# Patient Record
Sex: FEMALE | Race: WHITE
[De-identification: names, ages, dates, MRNs, and addresses within clinical notes are randomized per-mention and may not be internally consistent; named-entity substitution may affect disease eponyms.]

---

## 2017-02-22 ENCOUNTER — HOSPITAL ENCOUNTER (OUTPATIENT)
Dept: HOSPITAL 39 - GMAH | Age: 77
Discharge: HOME | End: 2017-02-22
Attending: FAMILY MEDICINE
Payer: MEDICARE

## 2017-02-22 DIAGNOSIS — I10: Primary | ICD-10-CM

## 2017-03-01 ENCOUNTER — HOSPITAL ENCOUNTER (OUTPATIENT)
Dept: HOSPITAL 39 - MRI | Age: 77
Discharge: HOME | End: 2017-03-01
Attending: FAMILY MEDICINE
Payer: MEDICARE

## 2017-03-01 DIAGNOSIS — M17.12: Primary | ICD-10-CM

## 2017-03-01 DIAGNOSIS — M25.462: ICD-10-CM

## 2017-03-02 NOTE — MRI
Study: MRI of the Left Knee. 



Indication: LEFT KNEE PAIN  



Technique: Multiplanar, multi sequence MRI of the left knee was

obtained without intravenous contrast. 



Comparison: None.



Findings:



ACL, PCL, and lateral compartment ligament intact. Remote MCL

sprain.



Macerated body medial meniscus extruded by 5 mm. Advanced-severe

medial compartment osteoarthritis with complete grade 4 chondral

loss and significant cortical remodeling



A component of subtle vertical radial tear of the midsubstance

body lateral meniscus is suspected. Grade 2/3 chondral thinning

throughout the majority lateral compartment with several areas of

grade 4 chondral loss centrally.



Tendinosis quadriceps tendon insertion and patellar tendon origin

without tear. Bone-on-bone appearance between the medial patellar

facet and medial femoral trochlea with grade 4 chondral loss and

cortical remodeling at the site. Less pronounced chondrosis more

laterally.



Moderate size knee effusion. Ganglion formation associated with

the origins of the medial and lateral heads of gastrocnemius

musculature. Small Baker cyst. No acute fracture or osseous

contusion.



Impression:



Maceration medial meniscus.



Suspected vertical radial tearing body lateral meniscus.



Tricompartmental osteoarthritis most pronounced medial

compartment where there is advanced severe changes.



Moderate-sized knee effusion.



Additional findings as above.



Electronically signed by:  Prateek Stevens MD  3/2/2017 8:16 AM CST

## 2017-06-19 ENCOUNTER — HOSPITAL ENCOUNTER (INPATIENT)
Dept: HOSPITAL 39 - MS | Age: 77
LOS: 6 days | Discharge: HOME HEALTH | DRG: 561 | End: 2017-06-25
Attending: NURSE PRACTITIONER | Admitting: NURSE PRACTITIONER
Payer: MEDICARE

## 2017-06-19 DIAGNOSIS — Z79.1: ICD-10-CM

## 2017-06-19 DIAGNOSIS — I10: ICD-10-CM

## 2017-06-19 DIAGNOSIS — Z47.1: Primary | ICD-10-CM

## 2017-06-19 DIAGNOSIS — M17.0: ICD-10-CM

## 2017-06-19 DIAGNOSIS — K21.9: ICD-10-CM

## 2017-06-19 DIAGNOSIS — M19.90: ICD-10-CM

## 2017-06-19 DIAGNOSIS — Z96.652: ICD-10-CM

## 2017-06-19 DIAGNOSIS — D50.9: ICD-10-CM

## 2017-06-19 DIAGNOSIS — E78.5: ICD-10-CM

## 2017-06-19 DIAGNOSIS — Z79.899: ICD-10-CM

## 2017-06-19 DIAGNOSIS — F32.9: ICD-10-CM

## 2017-06-19 DIAGNOSIS — F41.9: ICD-10-CM

## 2017-06-19 RX ADMIN — MAGNESIUM HYDROXIDE PRN ML: 400 SUSPENSION ORAL at 17:46

## 2017-06-19 RX ADMIN — HYDROCODONE BITARTRATE AND ACETAMINOPHEN PRN EA: 10; 325 TABLET ORAL at 16:37

## 2017-06-19 RX ADMIN — HYDROCODONE BITARTRATE AND ACETAMINOPHEN PRN EA: 10; 325 TABLET ORAL at 21:13

## 2017-06-19 NOTE — HP
SUPERVISING PHYSICIAN:  Adrian Brice M.D.



CHIEF COMPLAINT:  Status post left knee surgery for total left knee 
arthroplasty requiring Swing Bed for continued physical therapy and 
rehabilitation.



HISTORY OF PRESENT ILLNESS:  Ms. Pierson is a 77 year-old  female 
patient that had a left total knee arthroplasty on 17 performed by Dr. Ricardo Fonseca at [facility] in [place].  She was discharged on 17 
home to have home health physical therapy.  Arrangements were never made for 
the physical therapy postoperatively and the patient was seen this past  
by home health nurse who requested that the patient be admitted today into 
Swing Bed so that she could have continued inpatient physical therapy.  She was 
admitted to Swing Bed on 17 for continued physical therapy and 
rehabilitation for a left total knee arthroplasty.  She was admitted in stable 
condition.



PAST MEDICAL HISTORY: 

1.   Hyperlipidemia.

2.   Hypertension.

3.   Gastroesophageal reflux disease.

4.   Osteoarthritis.

5.   Iron deficiency anemia.

6.   Systolic murmur.

7.   Depression and anxiety disorder.



PAST SURGICAL HISTORY:

1.   Recent left total knee arthroplasty on 17 by orthopedic surgeon, Dr. Silvestre Fonseca in [place] at [facility].

2.   Appendectomy.

3.   Cholecystectomy.

4.   Hernia repair.



HOME MEDICATIONS:

1.   Klonopin 0.25 mg at bedtime.

2.   Hydrochlorothiazide 12.5 mg twice daily.

3.   Captopril 25 mg twice daily.

4.   Prilosec 20 mg daily.

5.   Dulcolax sodium 100 mg daily.

6.   Nexium 20 mg daily.

7.   Mobic 7.5 mg daily.

8.   Diltiazem  mg daily.

9.   Fluoxetine 20 mg daily.

10. Norco 10/325 one every 4 hours as needed for pain.



ALLERGIES:  NO KNOWN DRUG ALLERGIES.



FAMILY HISTORY:  Father  at age 83 from advanced age.  Mother  at 
age 44 secondary to advanced age and had a history of colon cancer.



SOCIAL HISTORY:  The patient is a retired .  She is .  She has 
never smoked and does not drink alcohol or do illicit drugs.  The patient lives 
in [place].  Her primary care physician is Dr. Marmolejo.



REVIEW OF SYSTEMS: 

CONSTITUTIONAL:  Denies any fevers, chills, fatigues or unintentional weight 
gain.

CARDIOVASCULAR:  Denies any chest pains, palpitations or syncopal episodes.

RESPIRATORY:  Denies any recent coughs or dyspnea.

GASTROINTESTINAL:  Denies any constipation or diarrhea, although she says she 
has not had a bowel movement since surgery.  

MUSCULOSKELETAL:  Positive for osteoarthritis and general arthralgias with 
recent left total knee arthroplasty as noted in the History of Present Illness.

NEUROLOGIC:  Denies any ataxia, headaches, syncopal episodes or other 
neurological deficits.



PHYSICAL EXAMINATION: 



VITAL SIGNS:  Temperature 97.9, pulse 72, blood pressure 130/70, respirations 
16 satting 96% on room air.  Admission weight was 80.8 kg.



GENERAL:  The patient is well-nourished, well-developed.  Appears to be in no 
acute distress with good pain control.  She is alert and oriented times three.



HEENT:  Tympanic membranes are clear bilaterally.  Oropharynx is pink and moist 
without any lesions.



NECK:  Supple with full range of motion, non-tender.  No jugular venous 
distention.



CHEST:  Lungs are clear to auscultation bilaterally without any rhonchi, 
wheezing or rales.



CARDIOVASCULAR:  Heart is regular rate and rhythm with a mild systolic murmur 
heard.



ABDOMEN:  Soft, non-tender.  Positive bowel sounds.



EXTREMITIES:  Left leg has multiple areas of ecchymosis from previous surgery.  
There is a dressing in place over the knee with pulses being strong.  Capillary 
refill is brisk distally.



NEUROLOGIC:  Cranial nerves II-XII are grossly intact.  Facial features are 
symmetrical.  Extraocular movements are within normal limits.  There is no 
notable nystagmus.  She is alert and oriented times three.  There were no 
discernible sensory or motor deficits.



LABORATORY:  CBC, CMP and urinalysis are pending.



RADIOLOGY:  Chest x-ray is pending.



ASSESSMENT: 

1.   Postoperative day 6 left total knee arthroplasty for advanced 
osteoarthritis

      having failed to respond to outpatient treatment plan performed by Dr. Silvestre Fonseca, orthopedist at [facility] in [place].

2.   Hypertension.

3.   Gastroesophageal reflux disease on a proton pump inhibitor.

4.   Osteoarthritis involving both knees, worse on the left requiring surgery as

      noted above.

5.   Iron deficiency anemia.

6.   History of depression and anxiety disorder.

7.   History of systolic murmur.



PLAN:  The patient was admitted to Swing Bed today on 17 for continued 
physical therapy and rehabilitation as she was not getting any postoperative 
discharge physical therapy at home after leaving [facility].  She has been 
utilizing CPM with instructions from Dr. Fonseca.  She has a followup 
appointment to see Dr. Fonseca in 2 weeks in [place] 17 at 1315.  
Will resume her home medications as previous.  Once updated in the computer, 
she will be started on DVT prophylaxis per standard orthopedic protocol for 
total knee arthroplasty with Xarelto.  Will have a Physical Therapy 
consultation.  Will anticipate length of stay to be 3 to 5 days depending on 
her progression through her physical therapy efforts.  Will continue to follow 
the patient medically as she progresses through her physical therapy.  Until 
discharge, will continue to monitor and treat appropriately.



#[id number]
[facility]

## 2017-06-20 RX ADMIN — MAGNESIUM HYDROXIDE PRN ML: 400 SUSPENSION ORAL at 10:39

## 2017-06-20 RX ADMIN — DOCUSATE SODIUM SCH MG: 100 CAPSULE, LIQUID FILLED ORAL at 10:38

## 2017-06-20 RX ADMIN — ONDANSETRON PRN MG: 8 TABLET, ORALLY DISINTEGRATING ORAL at 13:12

## 2017-06-20 RX ADMIN — OMEPRAZOLE SCH MG: 20 CAPSULE, DELAYED RELEASE ORAL at 05:37

## 2017-06-20 RX ADMIN — HYDROCODONE BITARTRATE AND ACETAMINOPHEN PRN EA: 10; 325 TABLET ORAL at 18:21

## 2017-06-20 RX ADMIN — RIVAROXABAN SCH MG: 10 TABLET, FILM COATED ORAL at 09:32

## 2017-06-20 RX ADMIN — DILTIAZEM HYDROCHLORIDE SCH MG: 180 CAPSULE, COATED, EXTENDED RELEASE ORAL at 09:32

## 2017-06-20 RX ADMIN — HYDROCODONE BITARTRATE AND ACETAMINOPHEN PRN EA: 10; 325 TABLET ORAL at 13:22

## 2017-06-20 NOTE — RAD
Procedure:  XR CHEST 2 VIEWS        



Exam Date:  6/20/2017



Ordering Provider:  Phong Vidales NP



Clinical Indication:  cough



Comparison: None



Findings: 

Cardiac silhouette: Normal

Pulmonary vasculature : Normal

Mediastinal contour: Normal 

Aortic contour: Normal 

Focal lung consolidation: None

Pleural effusion: None

Pneumothorax: None

Acute bony or soft tissue abnormality: None



Impression: 

1. No acute abnormalities in the chest.



Electronically signed by:  Harvey Gomez MD  6/20/2017 6:57 AM CDT

Workstation: [id number]

## 2017-06-21 RX ADMIN — HYDROCODONE BITARTRATE AND ACETAMINOPHEN PRN EA: 10; 325 TABLET ORAL at 15:21

## 2017-06-21 RX ADMIN — HYDROCODONE BITARTRATE AND ACETAMINOPHEN PRN EA: 10; 325 TABLET ORAL at 07:33

## 2017-06-21 RX ADMIN — MAGNESIUM HYDROXIDE PRN ML: 400 SUSPENSION ORAL at 11:26

## 2017-06-21 RX ADMIN — ONDANSETRON PRN MG: 8 TABLET, ORALLY DISINTEGRATING ORAL at 11:25

## 2017-06-21 RX ADMIN — DOCUSATE SODIUM SCH MG: 100 CAPSULE, LIQUID FILLED ORAL at 09:19

## 2017-06-21 RX ADMIN — HYDROCODONE BITARTRATE AND ACETAMINOPHEN PRN EA: 10; 325 TABLET ORAL at 20:09

## 2017-06-21 RX ADMIN — ONDANSETRON PRN MG: 8 TABLET, ORALLY DISINTEGRATING ORAL at 06:24

## 2017-06-21 RX ADMIN — RIVAROXABAN SCH MG: 10 TABLET, FILM COATED ORAL at 09:19

## 2017-06-21 RX ADMIN — DILTIAZEM HYDROCHLORIDE SCH MG: 180 CAPSULE, COATED, EXTENDED RELEASE ORAL at 09:19

## 2017-06-21 RX ADMIN — OMEPRAZOLE SCH MG: 20 CAPSULE, DELAYED RELEASE ORAL at 06:45

## 2017-06-22 RX ADMIN — DOCUSATE SODIUM SCH MG: 100 CAPSULE, LIQUID FILLED ORAL at 09:40

## 2017-06-22 RX ADMIN — HYDROCODONE BITARTRATE AND ACETAMINOPHEN PRN EA: 10; 325 TABLET ORAL at 11:14

## 2017-06-22 RX ADMIN — HYDROCODONE BITARTRATE AND ACETAMINOPHEN PRN EA: 10; 325 TABLET ORAL at 15:26

## 2017-06-22 RX ADMIN — OMEPRAZOLE SCH MG: 20 CAPSULE, DELAYED RELEASE ORAL at 06:31

## 2017-06-22 RX ADMIN — DILTIAZEM HYDROCHLORIDE SCH MG: 180 CAPSULE, COATED, EXTENDED RELEASE ORAL at 09:40

## 2017-06-22 RX ADMIN — RIVAROXABAN SCH MG: 10 TABLET, FILM COATED ORAL at 09:40

## 2017-06-22 RX ADMIN — HYDROCODONE BITARTRATE AND ACETAMINOPHEN PRN EA: 10; 325 TABLET ORAL at 19:43

## 2017-06-22 RX ADMIN — CYCLOBENZAPRINE HYDROCHLORIDE PRN MG: 10 TABLET, FILM COATED ORAL at 21:00

## 2017-06-22 NOTE — PN
SUPERVISING PHYSICIAN:  Adrian Brice MD



DATE:  06/22/17



SUBJECTIVE: The patient is sitting up in her chair in her hospital room.  She 
is watching television.  She has no complaints of nausea, vomiting, diarrhea, 
constipation, shortness of breath or chest pain.  She feels she is progressing 
very well and actually feels very good.



OBJECTIVE:  VITAL SIGNS:  Afebrile.  Heart rate 72.  Blood pressure 99/61.  
Respiratory rate 18.  O2 saturation 95% on room air.  

LUNGS:  Clear to auscultation bilaterally.  

CARDIAC: Regular rate and rhythm.  

ABDOMEN:  Soft, nontender, nondistended.  Bowel sounds are positive.  

EXTREMITIES: Bilateral pedal pulses are palpable at +2.  She has a dressing to 
her left knee that is dry and intact.  She does have some ecchymosis in the 
tissues surrounding her incisional site.  Otherwise, there are no signs or 
symptoms of complications.

NEUROLOGIC: Awake, alert and oriented times three.  



LABORATORY:  There are no labs or films to report at this time.  



ASSESSMENT: 

1.   Postoperative day 9 left total knee arthroplasty for advanced 
osteoarthritis

      having failed to respond to outpatient treatment plan performed by Dr. Silvestre Fonseca, orthopedist at [facility] in [place].

2.   Hypertension.

3.   Gastroesophageal reflux disease.

4.   Osteoarthritis involving both knees.

5.   Iron deficiency anemia.

6.   History of depression and anxiety disorder.

7.   History of systolic murmur.



PLAN:  We will continue present supportive care.  Her strengthening and 
conditioning will be per physical therapy.  Her followups will be as indicated 
by Dr. Fonseca in [place].  Hopefully she can be discharged when she 
completes her physical therapy to home with home health and physical therapy.  
Otherwise, we will continue to monitor the patient closely and followup as 
needed.  



#[id number]
[facility]

## 2017-06-23 RX ADMIN — DOCUSATE SODIUM SCH MG: 100 CAPSULE, LIQUID FILLED ORAL at 10:15

## 2017-06-23 RX ADMIN — HYDROCODONE BITARTRATE AND ACETAMINOPHEN PRN EA: 10; 325 TABLET ORAL at 07:41

## 2017-06-23 RX ADMIN — CYCLOBENZAPRINE HYDROCHLORIDE PRN MG: 10 TABLET, FILM COATED ORAL at 15:02

## 2017-06-23 RX ADMIN — OMEPRAZOLE SCH MG: 20 CAPSULE, DELAYED RELEASE ORAL at 05:43

## 2017-06-23 RX ADMIN — DILTIAZEM HYDROCHLORIDE SCH MG: 180 CAPSULE, COATED, EXTENDED RELEASE ORAL at 10:15

## 2017-06-23 RX ADMIN — HYDROCODONE BITARTRATE AND ACETAMINOPHEN PRN EA: 10; 325 TABLET ORAL at 16:55

## 2017-06-23 RX ADMIN — RIVAROXABAN SCH MG: 10 TABLET, FILM COATED ORAL at 10:15

## 2017-06-23 RX ADMIN — HYDROCODONE BITARTRATE AND ACETAMINOPHEN PRN EA: 10; 325 TABLET ORAL at 12:08

## 2017-06-24 RX ADMIN — HYDROCODONE BITARTRATE AND ACETAMINOPHEN PRN EA: 10; 325 TABLET ORAL at 17:56

## 2017-06-24 RX ADMIN — CYCLOBENZAPRINE HYDROCHLORIDE PRN MG: 10 TABLET, FILM COATED ORAL at 21:16

## 2017-06-24 RX ADMIN — HYDROCODONE BITARTRATE AND ACETAMINOPHEN PRN EA: 10; 325 TABLET ORAL at 08:39

## 2017-06-24 RX ADMIN — OMEPRAZOLE SCH MG: 20 CAPSULE, DELAYED RELEASE ORAL at 06:08

## 2017-06-24 RX ADMIN — RIVAROXABAN SCH MG: 10 TABLET, FILM COATED ORAL at 08:38

## 2017-06-24 RX ADMIN — HYDROCODONE BITARTRATE AND ACETAMINOPHEN PRN EA: 10; 325 TABLET ORAL at 12:03

## 2017-06-24 RX ADMIN — DOCUSATE SODIUM SCH MG: 100 CAPSULE, LIQUID FILLED ORAL at 08:39

## 2017-06-24 RX ADMIN — CYCLOBENZAPRINE HYDROCHLORIDE PRN MG: 10 TABLET, FILM COATED ORAL at 12:02

## 2017-06-24 RX ADMIN — DILTIAZEM HYDROCHLORIDE SCH MG: 180 CAPSULE, COATED, EXTENDED RELEASE ORAL at 08:39

## 2017-06-24 RX ADMIN — ONDANSETRON PRN MG: 8 TABLET, ORALLY DISINTEGRATING ORAL at 06:32

## 2017-06-25 VITALS — DIASTOLIC BLOOD PRESSURE: 75 MMHG | SYSTOLIC BLOOD PRESSURE: 117 MMHG | TEMPERATURE: 98.6 F | OXYGEN SATURATION: 97 %

## 2017-06-25 RX ADMIN — OMEPRAZOLE SCH MG: 20 CAPSULE, DELAYED RELEASE ORAL at 06:16

## 2017-06-25 RX ADMIN — DOCUSATE SODIUM SCH MG: 100 CAPSULE, LIQUID FILLED ORAL at 08:01

## 2017-06-25 RX ADMIN — RIVAROXABAN SCH MG: 10 TABLET, FILM COATED ORAL at 08:01

## 2017-06-25 RX ADMIN — DILTIAZEM HYDROCHLORIDE SCH MG: 180 CAPSULE, COATED, EXTENDED RELEASE ORAL at 08:01

## 2017-06-25 NOTE — DS
SUPERVISING PHYSICIAN:    Silvestre Driscoll MD



DISCHARGE DIAGNOSES: 

1.   Postoperative day #12 total knee arthroplasty for advanced osteoarthritis

      having failed to respond to outpatient treatment plan performed by Dr. Silvestre Fonseca, orthopedist at [facility] in [place] on June 06/13/
17.

2.   Hypertension.

3.   Gastroesophageal reflux disease.

4.   Osteoarthritis involving both knees.

5.   Iron deficiency anemia.

6.   History of depression and anxiety disorder.

7.   History of systolic murmur.



HISTORY OF PRESENT ILLNESS:   :  Ms. Pierson is a 77 year-old female patient who 
had a left total knee arthroplasty on 06/13/17 performed by Dr. Ricardo Fonseca at 
[facility] in [place].  She was discharged on 06/16/17 to have home health 
physical therapy.  For some reason, arrangements were never made and [facility] saw her on Sunday after her discharge and requested that the patient be 
admitted to the hospital for Swing Bed for strengthening and physical therapy 
postoperatively.  She was admitted on June 19 and has continued with her 
physical therapy and strengthening and conditioning, she has met her goals and 
now she is ready to be discharged.  Initially, there was some question if she 
would go home on home health physical therapy but the patient has progressed to 
the point where I believe she can be seen at home by [facility] over the next 
few days.  Her home health is [facility] and 
then she will be set up for outpatient physical therapy next week.



DISCHARGE PLAN:

the patient will be discharged home in stable condition. [facility] will see her tomorrow and evaluate her as well as 
set up plans for physical therapy.  She is to resume her previous medications 
with the addition that I have given her some Flexeril for muscle spasms. She 
has completed her Xarelto treatments here in the hospital so she does not have 
to continue that.  She has a followup appointment with Dr. Fonseca tomorrow as 
well as Dr. Marmolejo on July 7 at 10:00 AM. She is to call Dr. Fonseca's office, 
Dr. Marmolejo's office or return to the hospital for any further problems or 
complications.



DISCHARGE MEDICATIONS:

1.  Hydrocodone.

2.  Clonazepam.

3.  Hydrochlorothiazide.

4.  Prozac.

5.  Diltiazem.

6.  Captopril.

7.  Meloxicam.

8.  Nexium.

9.  Dulcolax.

10. Prilosec.

11. Cyclobenzaprine.



Dr. Driscoll is the collaborating physician and available for consultation, 



#[id number]
[facility]

## 2017-07-04 ENCOUNTER — HOSPITAL ENCOUNTER (EMERGENCY)
Dept: HOSPITAL 39 - ER | Age: 77
Discharge: HOME | End: 2017-07-04
Payer: MEDICARE

## 2017-07-04 VITALS — TEMPERATURE: 99.6 F | OXYGEN SATURATION: 95 %

## 2017-07-04 VITALS — DIASTOLIC BLOOD PRESSURE: 82 MMHG | SYSTOLIC BLOOD PRESSURE: 133 MMHG

## 2017-07-04 DIAGNOSIS — Z79.899: ICD-10-CM

## 2017-07-04 DIAGNOSIS — M19.90: ICD-10-CM

## 2017-07-04 DIAGNOSIS — I10: ICD-10-CM

## 2017-07-04 DIAGNOSIS — K59.03: Primary | ICD-10-CM

## 2017-07-04 NOTE — ED.PDOC
History of Present Illness





- General


Chief Complaint: GI Problem


Stated Complaint: constipation


Time Seen by Provider: 17 11:33


Source: patient, RN notes reviewed, Vital Signs reviewed


Exam Limitations: no limitations





- History of Present Illness


Initial Comments: 





Danna Pierson 78 y/o female stated had knee surgery one week ago and is taking 

hydrocodone for pain getting constipated recently with no bowel movement for 3 

days and had small bm yesteday.No nausea/vomiting no abdominal pain no belly 

distention


Timing/Duration: other - 3 days


Improving Factors: nothing


Worsening Factors: other - immobilization and pain medication


Allergies/Adverse Reactions: 


Allergies





NO KNOWN ALLERGY Allergy (Verified 17 11:36)


 








Home Medications: 


Ambulatory Orders





Captopril 25 mg PO BID 17 


Diltiazem HCl Extended Release [Diltiazem HCl ER] 360 mg PO DAILY 17 


Docusate Sodium [Dulcolax Stool Softener] 100 mg PO DAILY 17 


Esomeprazole Magnesium [Nexium] 20 mg PO DAILY 17 


Fluoxetine HCl [Prozac] 20 mg PO DAILY 17 


HYDROcodone 10MG/APAP 325MG [[place] 10/325] 1 ea PO Q4HR PRN 17 


Hydrochlorothiazide 12.5 mg PO BID 17 


Meloxicam [Mobic] 7.5 mg PO DAILY 17 


Omeprazole [Prilosec Cap] 20 mg PO DAILY 17 


cloNAZepam [Klonopin] 0.25 mg PO BEDTIME 17 


Cyclobenzaprine HCl [Flexeril] 10 mg PO Q8H PRN #20 tablet 17 











Review of Systems





- Review of Systems


Constitutional: States: no symptoms reported


EENTM: States: no symptoms reported


Respiratory: States: no symptoms reported


Cardiology: States: no symptoms reported


Gastrointestinal/Abdominal: States: see HPI


Genitourinary: States: no symptoms reported


Musculoskeletal: States: no symptoms reported


Skin: States: no symptoms reported


Neurological: States: no symptoms reported


Endocrine: States: no symptoms reported


Hematologic/Lymphatic: States: no symptoms reported





Past Medical History (General)





- Patient Medical History


Hx Seizures: No


Hx Stroke: No


Hx Asthma: No


Hx of COPD: No


Hx Pacemaker: No


Hx Hypertension: Yes


Hx Diabetes: No


Hx Other PMH: Yes - degenerative joints


Surgical History: appendectomy, cholecystectomy, tonsillectomy, other - hiatal 

hernia repair,left knee surgery





- Social History


Hx Tobacco Use: No


Hx Alcohol Use: No


Hx Substance Use: No


Hx Physical Abuse: No


Hx Emotional Abuse: No





- Activities of Daily Living


Patient Lives Alone: No - family


Grooming Ability: Independent


Eating (Feeding) Ability: Independent


Toileting Ability: Standby Assistance





Family Medical History





- Family History


  ** Mother


Living Status: 


Hx Family Asthma: Yes


Hx Family Cancer: Yes - intestional





  ** Father


Living Status: 





Physical Exam





- Physical Exam


General Appearance: Alert, No apparent distress


Eye Exam: bilateral normal


Ears, Nose, Throat: hearing grossly normal, normal ENT inspection, normal 

pharynx


Neck: non-tender, full range of motion, supple, normal inspection


Respiratory: chest non-tender, lungs clear, normal breath sounds, no 

respiratory distress


Cardiovascular/Chest: normal peripheral pulses, regular rate, rhythm, no edema, 

no gallop, no JVD, no murmur


Peripheral Pulses: radial,right: 2+, radial,left: 2+


Gastrointestinal/Abdominal: normal bowel sounds, non tender, soft, no 

organomegaly, no pulsatile mass


Rectal Exam: normal exam, normal rectal tone, other - hard stool rectal vault


Back Exam: normal inspection, no CVA tenderness, no vertebral tenderness


Extremity: normal range of motion, non-tender, normal inspection, no pedal edema

, no calf tenderness


Neurologic: no motor/sensory deficits, alert, oriented x 3


Skin Exam: normal color, warm/dry


Lymphatic: no adenopathy





Progress





- Progress


Progress: 





17 11:54


 Vital Signs - 8 hr











  17





  11:31


 


Temperature 99.6 F


 


Pulse Rate [ 86





pulse ox] 


 


Respiratory 20





Rate 


 


Blood Pressure 139/74





[Left Arm] 


 


O2 Sat by Pulse 95





Oximetry 











17 12:49


Hard stools bowel movement after enema and suppository





- Results/Orders


Results/Orders: 





 Laboratory Results











WBC  5.5 K/mm3 (4.8-10.8)   17  11:43    


 


RBC  3.97 M/mm3 (4.20-5.40)  L  17  11:43    


 


Hgb  10.9 gm/dL (12.0-16.0)  L  17  11:43    


 


Hct  33.0 % (36.0-47.0)  L  17  11:43    


 


MCV  83.1 fl (81.0-99.0)   17  11:43    


 


MCH  27.4 pg (27.0-31.0)   17  11:43    


 


MCHC  33.1 g/dL (33.0-37.0)   17  11:43    


 


RDW  14.0 % (11.5-14.5)   17  11:43    


 


Plt Count  433 K/mm3 (130-400)  H  17  11:43    


 


MPV  6.6 fl (7.40-10.4)  L  17  11:43    


 


Absolute Neuts (auto)  3.70 K/uL (1.8-6.8)   17  11:43    


 


Absolute Lymphs (auto)  1.40 K/uL (1.0-3.4)   17  11:43    


 


Absolute Monos (auto)  0.40 K/uL (0.2-0.8)   17  11:43    


 


Absolute Eos (auto)  0.10 K/uL (0.0-0.4)   17  11:43    


 


Absolute Basos (auto)  0.00 K/uL (0.0-0.1)   17  11:43    


 


Neutrophils %  66.0 % (42.0-78.0)   17  11:43    


 


Lymphocytes %  24.5 % (20.0-50.0)   17  11:43    


 


Monocytes %  7.1 % (2.0-9.0)   17  11:43    


 


Eosinophils %  2.0 % (1.0-5.0)   17  11:43    


 


Basophils %  0.4 % (0.0-2.0)   17  11:43    


 


Sodium  139 mmol/L (135-145)   17  11:43    


 


Potassium  3.8 mmol/L (3.6-5.0)   17  11:43    


 


Chloride  103 mmol/L (101-111)   17  11:43    


 


Carbon Dioxide  27 mmol/L (21-31)   17  11:43    


 


Anion Gap  12.8  (12-18)   17  11:43    


 


BUN  11 mg/dL (7-18)   17  11:43    


 


Creatinine  0.72 mg/dL (0.6-1.3)   17  11:43    


 


BUN/Creatinine Ratio  15.3  (10-20)   17  11:43    


 


Random Glucose  118 mg/dL ()  H  17  11:43    


 


Serum Osmolality  278.0 mOsm/L (275-295)   17  11:43    


 


Calcium  9.2 mg/dL (8.4-10.2)   17  11:43    














- EKG/XRAY/CT


XRAY: abdomen - prominent stool colon,no free air,no bowel obstructio/

radiologist





Departure





- Departure


Clinical Impression: 


 Constipation due to pain medication





Time of Disposition: 12:50


Disposition: Discharge to Home or Self Care


Condition: Fair


Departure Forms:  ED Discharge - Pt. Copy, Patient Portal Self Enrollment


Instructions:  Constipation (Alternative Therapy), Increased Dietary Fiber May 

Improve Constipation Conditions With Pelvic Dallin, DI for Constipation


Diet: other - high fiber diet


Referrals: 


Devin Marmolejo MD [Primary Care Provider] - 1-2 Weeks


Home Medications: 


Ambulatory Orders





Captopril 25 mg PO BID 17 


Diltiazem HCl Extended Release [Diltiazem HCl ER] 360 mg PO DAILY 17 


Docusate Sodium [Dulcolax Stool Softener] 100 mg PO DAILY 17 


Esomeprazole Magnesium [Nexium] 20 mg PO DAILY 17 


Fluoxetine HCl [Prozac] 20 mg PO DAILY 17 


HYDROcodone 10MG/APAP 325MG [[place] 10/325] 1 ea PO Q4HR PRN 17 


Hydrochlorothiazide 12.5 mg PO BID 17 


Meloxicam [Mobic] 7.5 mg PO DAILY 17 


Omeprazole [Prilosec Cap] 20 mg PO DAILY 17 


cloNAZepam [Klonopin] 0.25 mg PO BEDTIME 17 


Cyclobenzaprine HCl [Flexeril] 10 mg PO Q8H PRN #20 tablet 17 








Additional Instructions: 


Follow up with primary md 2017 as needed call for appointment

## 2018-02-27 ENCOUNTER — HOSPITAL ENCOUNTER (OUTPATIENT)
Dept: HOSPITAL 39 - GMAH | Age: 78
End: 2018-02-27
Attending: FAMILY MEDICINE
Payer: MEDICARE

## 2018-02-27 DIAGNOSIS — E78.2: ICD-10-CM

## 2018-02-27 DIAGNOSIS — I10: Primary | ICD-10-CM

## 2018-11-05 ENCOUNTER — HOSPITAL ENCOUNTER (OUTPATIENT)
Dept: HOSPITAL 39 - GMAH | Age: 78
End: 2018-11-05
Attending: FAMILY MEDICINE
Payer: MEDICARE

## 2018-11-05 DIAGNOSIS — R07.9: Primary | ICD-10-CM

## 2018-11-12 ENCOUNTER — HOSPITAL ENCOUNTER (OUTPATIENT)
Dept: HOSPITAL 39 - GMAH | Age: 78
End: 2018-11-12
Attending: FAMILY MEDICINE
Payer: MEDICARE

## 2018-11-12 DIAGNOSIS — E53.8: Primary | ICD-10-CM

## 2019-04-11 ENCOUNTER — HOSPITAL ENCOUNTER (OUTPATIENT)
Dept: HOSPITAL 39 - GMAH | Age: 79
End: 2019-04-11
Attending: FAMILY MEDICINE
Payer: MEDICARE

## 2019-04-11 DIAGNOSIS — I10: Primary | ICD-10-CM

## 2019-09-26 ENCOUNTER — HOSPITAL ENCOUNTER (EMERGENCY)
Dept: HOSPITAL 39 - ER | Age: 79
Discharge: HOME | End: 2019-09-26
Payer: MEDICARE

## 2019-09-26 VITALS — SYSTOLIC BLOOD PRESSURE: 176 MMHG | OXYGEN SATURATION: 96 % | DIASTOLIC BLOOD PRESSURE: 67 MMHG

## 2019-09-26 VITALS — TEMPERATURE: 98.2 F

## 2019-09-26 DIAGNOSIS — R53.81: ICD-10-CM

## 2019-09-26 DIAGNOSIS — I10: ICD-10-CM

## 2019-09-26 DIAGNOSIS — R53.83: ICD-10-CM

## 2019-09-26 DIAGNOSIS — Z79.899: ICD-10-CM

## 2019-09-26 DIAGNOSIS — R10.13: ICD-10-CM

## 2019-09-26 DIAGNOSIS — R94.31: ICD-10-CM

## 2019-09-26 DIAGNOSIS — M79.661: ICD-10-CM

## 2019-09-26 DIAGNOSIS — R06.02: ICD-10-CM

## 2019-09-26 DIAGNOSIS — E86.0: Primary | ICD-10-CM

## 2019-09-26 PROCEDURE — 85610 PROTHROMBIN TIME: CPT

## 2019-09-26 PROCEDURE — 83880 ASSAY OF NATRIURETIC PEPTIDE: CPT

## 2019-09-26 PROCEDURE — 93971 EXTREMITY STUDY: CPT

## 2019-09-26 PROCEDURE — 80048 BASIC METABOLIC PNL TOTAL CA: CPT

## 2019-09-26 PROCEDURE — 82553 CREATINE MB FRACTION: CPT

## 2019-09-26 PROCEDURE — 85025 COMPLETE CBC W/AUTO DIFF WBC: CPT

## 2019-09-26 PROCEDURE — 82550 ASSAY OF CK (CPK): CPT

## 2019-09-26 PROCEDURE — 93005 ELECTROCARDIOGRAM TRACING: CPT

## 2019-09-26 PROCEDURE — 85730 THROMBOPLASTIN TIME PARTIAL: CPT

## 2019-09-26 PROCEDURE — 71045 X-RAY EXAM CHEST 1 VIEW: CPT

## 2019-09-26 PROCEDURE — 36415 COLL VENOUS BLD VENIPUNCTURE: CPT

## 2019-09-26 PROCEDURE — 84443 ASSAY THYROID STIM HORMONE: CPT

## 2019-09-26 PROCEDURE — 84484 ASSAY OF TROPONIN QUANT: CPT

## 2019-09-26 NOTE — ED.PDOC
History of Present Illness





- General


Chief Complaint: General


Stated Complaint: dizziness and weakness


Time Seen by Provider: 19 12:23


Source: patient, RN notes reviewed, Vital Signs reviewed, family - Sons and 

daughter in law


Exam Limitations: no limitations





- History of Present Illness


Initial Comments: 





Pt presents with c/o worsening malaise and fatigue over the last 24 hours.  Pt 

states she doesn't feel well.  Pt c/o mid epigastric pain.  Mild sob.  Pt denies

cp/vomiting/diarrhea/blurry vision.  She does c/o dizziness but no vertigo.  

Nothing makes it better or worse.  


Timing/Duration: 24 hours, getting worse


Severity: moderate


Improving Factors: nothing


Worsening Factors: nothing


Associated Symptoms: loss of appetite, malaise, shortness of breath


Allergies/Adverse Reactions: 


Allergies





NO KNOWN ALLERGY Allergy (Verified 17 11:36)


   





Home Medications: 


Ambulatory Orders





Captopril 25 mg PO BID 17 


Diltiazem HCl Extended Release [Diltiazem HCl ER] 360 mg PO DAILY 17 


Docusate Sodium [Dulcolax Stool Softener] 100 mg PO DAILY 17 


Esomeprazole Magnesium [Nexium] 20 mg PO DAILY 17 


Fluoxetine HCl [Prozac] 20 mg PO DAILY 17 


Hydrochlorothiazide 12.5 mg PO BID 17 


cloNAZepam [Klonopin] 0.25 mg PO BEDTIME 17 


Cyclobenzaprine HCl [Flexeril] 10 mg PO Q8H PRN #20 tablet 17 


Potassium Chloride Tab [Micro-K] 10 meq PO BID 19 











Review of Systems





- Review of Systems


Constitutional: States: see HPI, malaise, weakness


EENTM: States: no symptoms reported


Respiratory: States: short of breath.  Denies: cough, stridor, wheezing


Cardiology: States: no symptoms reported


Gastrointestinal/Abdominal: States: see HPI, abdominal pain, nausea.  Denies: 

constipation, diarrhea, vomiting


Genitourinary: States: no symptoms reported


Musculoskeletal: States: other - pt c/o pain behind right knee


Skin: States: no symptoms reported


Neurological: States: weakness.  Denies: anxiety, depressed, emotional problems,

headache, numbness, paresthesia, seizure, tingling, tremors


Endocrine: States: no symptoms reported


Hematologic/Lymphatic: States: no symptoms reported





Past Medical History (General)





- Patient Medical History


Hx Seizures: No


Hx Stroke: No


Hx Asthma: No


Hx of COPD: No


Hx Congestive Heart Failure: No


Hx Pacemaker: No


Hx Hypertension: Yes


Hx Diabetes: No


Surgical History: appendectomy, cholecystectomy





- Vaccination History


Hx Tetanus, Diphtheria Vaccination: Yes


Hx Influenza Vaccination: No


Hx Pneumococcal Vaccination: Yes





- Social History


Hx Tobacco Use: No


Hx Alcohol Use: No


Hx Substance Use: No


Hx Substance Use Treatment: No


Hx Depression: No


Hx Physical Abuse: No


Hx Emotional Abuse: No





- Female History


Patient is a Female of Child Bearing Age (10 -59 yrs old): No


Patient Pregnant: No





Family Medical History





- Family History


  ** Mother


Living Status: 


Hx Family Asthma: Yes


Hx Family Cancer: Yes - intestional





  ** Father


Living Status: 





Physical Exam





- Physical Exam


General Appearance: Alert, Anxious, Well Developed, Well Groomed, Well Hydrated,

Well Nourished


Eye Exam: bilateral normal


Ears, Nose, Throat: hearing grossly normal, normal ENT inspection, normal 

pharynx


Neck: full range of motion, supple, normal inspection


Respiratory: chest non-tender, lungs clear, normal breath sounds, no respiratory

distress, no accessory muscle use


Cardiovascular/Chest: normal peripheral pulses, regular rate, rhythm, no edema, 

no gallop, no JVD, no murmur


Gastrointestinal/Abdominal: normal bowel sounds, tenderness - epigastrum


Back Exam: normal inspection, no CVA tenderness, no vertebral tenderness


Extremity: normal range of motion, non-tender, normal inspection, other - TTP of

right posterior knee c/w Baker's cyst.


Neurologic: CNs II-XII nml as tested, no motor/sensory deficits, alert, normal 

mood/affect, oriented x 3


Skin Exam: normal color, warm/dry


Lymphatic: no adenopathy





Progress





- Progress


Progress: 





19 14:37


                                        





19 12:15


EKG STAT 





19 12:23


IV Care:Saline Lock per Protoc QSHIFT 


Telemetry .ONCE 


Sodium Chloride 0.9% (Flush) [Saline Flush Syringe]   10 ml IV PRN PRN 


Pulse Ox Stat 








                         Laboratory Results - last 24 hr











  19





  12:34 12:34


 


WBC  6.4 


 


RBC  4.99 


 


Hgb  14.0 


 


Hct  42.0 


 


MCV  84.2 


 


MCH  28.1 


 


MCHC  33.4 


 


RDW  13.7 


 


Plt Count  250 


 


MPV  7.6 


 


Absolute Neuts (auto)  4.50 


 


Absolute Lymphs (auto)  1.30 


 


Absolute Monos (auto)  0.50 


 


Absolute Eos (auto)  0.10 


 


Absolute Basos (auto)  0.00 


 


Neutrophils %  70.1 


 


Lymphocytes %  20.3 


 


Monocytes %  7.5 


 


Eosinophils %  1.7 


 


Basophils %  0.4 


 


PT  9.4 


 


INR  0.94 


 


PTT (SP)  25.1 


 


Sodium  134 L 


 


Potassium  3.8 


 


Chloride  99 L 


 


Carbon Dioxide  25 


 


Anion Gap  13.8 


 


BUN  18 


 


Creatinine  0.76 


 


BUN/Creatinine Ratio  23.7 H 


 


Random Glucose  97 


 


Serum Osmolality  270.1 L 


 


Calcium  8.9 


 


Magnesium  2.3 


 


Creatine Kinase  75 


 


CK-MB (CK-2)  1.5 


 


CK-MB (CK-2) %  Not Reportable 


 


Troponin I  < 0.02 


 


B-Natriuretic Peptide  22.2 


 


TSH   1.84











Chest,1 View  CLINICAL HISTORY:  weakness  COMPARISON:  2017  





IMPRESSION:  Single AP portable upright view of the chest shows cardiac si

lhouette and pulmonary vasculature to be within normal limits. Lungs are 

normally aerated. Linear interstitial scarring or atelectasis in the right mid 

chest is again seen. No new infiltrate or consolidation. No obvious pleural 

effusion or pneumothorax is seen.  





Electronically signed by: Jose Maria Stoddard MD 2019 12:58 PM CDT





EXAM DESCRIPTION: Venous,Lower Extremity RT  CLINICAL HISTORY: right posterior 

knee pain  COMPARISON: None Available.  TECHNIQUE: Right lower extremity venous 

duplex  FINDINGS:  Doppler evaluation of the right lower extremity deep veins 

was performed. Normal color flow is seen in the common femoral, superficial 

femoral, profunda femoral and greater saphenous veins. Normal flow is seen in 

the popliteal vein and veins below the knee in the calf.  Normal venous compress

ibility and flow augmentation.  





IMPRESSION:  Negative for evidence of deep venous thrombosis on right lower 

extremity venous Doppler sonogram.  





Electronically signed by: Mathieu Gtz MD 2019 1:55 PM CDT 


19 14:41








Pt's symptoms have resolved after gi cocktail and 1L of NS.  Pt appeared 

dehydrated on arrival.  Plan d/c home with f/u with pcp.





- EKG/XRAY/CT


Comments: NSR@90bpm, inferior infarct, age indeterminate, abnormal ekg.


CT Ordered: No


CT Interpretation Call Back: No





Departure





- Departure


Clinical Impression: 


 Dehydration, Malaise and fatigue





Time of Disposition: 14:43


Disposition: Discharge to Home or Self Care


Condition: Good


Departure Forms:  ED Discharge - Pt. Copy, Patient Portal Self Enrollment


Instructions:  Dehydration, Adult (DC), Fatigue (DC)


Referrals: 


Judah Gill MD [Primary Care Provider] - 1-2 Weeks


Home Medications: 


Ambulatory Orders





Captopril 25 mg PO BID 17 


Diltiazem HCl Extended Release [Diltiazem HCl ER] 360 mg PO DAILY 17 


Docusate Sodium [Dulcolax Stool Softener] 100 mg PO DAILY 17 


Esomeprazole Magnesium [Nexium] 20 mg PO DAILY 17 


Fluoxetine HCl [Prozac] 20 mg PO DAILY 17 


Hydrochlorothiazide 12.5 mg PO BID 17 


cloNAZepam [Klonopin] 0.25 mg PO BEDTIME 17 


Cyclobenzaprine HCl [Flexeril] 10 mg PO Q8H PRN #20 tablet 17 


Potassium Chloride Tab [Micro-K] 10 meq PO BID 19

## 2019-09-26 NOTE — US
EXAM DESCRIPTION: Venous,Lower Extremity RT



CLINICAL HISTORY: right posterior knee pain



COMPARISON: None Available.



TECHNIQUE: Right lower extremity venous duplex



FINDINGS:



Doppler evaluation of the right  lower extremity deep veins was

performed. Normal color flow is seen in the common femoral,

superficial femoral, profunda femoral and greater saphenous

veins. Normal flow is seen in the popliteal vein and veins below

the knee in the calf.



Normal venous compressibility and flow augmentation.



IMPRESSION:



Negative for evidence of deep venous thrombosis on right lower

extremity venous Doppler sonogram.



Electronically signed by:  Mathieu Gtz MD  9/26/2019 1:55

PM CDT Workstation: [id number]

## 2019-09-26 NOTE — RAD
EXAM DESCRIPTION: 



Chest,1 View



CLINICAL HISTORY: 



weakness



COMPARISON: 



June 20, 2017



IMPRESSION: 



Single AP portable upright view of the chest shows cardiac

silhouette and pulmonary vasculature to be within normal limits.

Lungs are normally aerated. Linear interstitial scarring or

atelectasis in the right mid chest is again seen. No new

infiltrate or consolidation.

No obvious pleural effusion or pneumothorax is seen.



Electronically signed by:  Jose Maria Stoddard MD  9/26/2019 12:58 PM CDT

Workstation: [id number]

## 2019-10-17 ENCOUNTER — HOSPITAL ENCOUNTER (OUTPATIENT)
Dept: HOSPITAL 39 - GMA MATASK | Age: 79
End: 2019-10-17
Attending: FAMILY MEDICINE
Payer: MEDICARE

## 2019-10-17 DIAGNOSIS — E53.8: ICD-10-CM

## 2019-10-17 DIAGNOSIS — R53.83: ICD-10-CM

## 2019-10-17 DIAGNOSIS — B96.81: Primary | ICD-10-CM

## 2020-10-15 ENCOUNTER — HOSPITAL ENCOUNTER (OUTPATIENT)
Dept: HOSPITAL 39 - GMA MATASK | Age: 80
End: 2020-10-15
Attending: FAMILY MEDICINE
Payer: MEDICARE

## 2020-10-15 DIAGNOSIS — R53.83: ICD-10-CM

## 2020-10-15 DIAGNOSIS — I10: ICD-10-CM

## 2020-10-15 DIAGNOSIS — D51.0: Primary | ICD-10-CM
